# Patient Record
Sex: MALE | Race: WHITE | NOT HISPANIC OR LATINO | Employment: UNEMPLOYED | URBAN - METROPOLITAN AREA
[De-identification: names, ages, dates, MRNs, and addresses within clinical notes are randomized per-mention and may not be internally consistent; named-entity substitution may affect disease eponyms.]

---

## 2024-11-02 ENCOUNTER — HOSPITAL ENCOUNTER (EMERGENCY)
Facility: HOSPITAL | Age: 49
Discharge: HOME | End: 2024-11-02
Attending: INTERNAL MEDICINE
Payer: MEDICAID

## 2024-11-02 VITALS
WEIGHT: 189.63 LBS | TEMPERATURE: 98 F | DIASTOLIC BLOOD PRESSURE: 77 MMHG | HEART RATE: 82 BPM | SYSTOLIC BLOOD PRESSURE: 140 MMHG | BODY MASS INDEX: 27.15 KG/M2 | RESPIRATION RATE: 17 BRPM | HEIGHT: 70 IN | OXYGEN SATURATION: 98 %

## 2024-11-02 DIAGNOSIS — N43.3 BILATERAL HYDROCELE: ICD-10-CM

## 2024-11-02 DIAGNOSIS — N39.0 URINARY TRACT INFECTION WITHOUT HEMATURIA, SITE UNSPECIFIED: ICD-10-CM

## 2024-11-02 DIAGNOSIS — K40.90 REDUCIBLE RIGHT INGUINAL HERNIA: Primary | ICD-10-CM

## 2024-11-02 DIAGNOSIS — N50.811 TESTICULAR PAIN, RIGHT: ICD-10-CM

## 2024-11-02 LAB
BACTERIA #/AREA URNS AUTO: ABNORMAL /HPF
BILIRUB UR QL STRIP.AUTO: NEGATIVE
C TRACH DNA SPEC QL NAA+PROBE: NOT DETECTED
CLARITY UR: CLEAR
COLOR UR AUTO: YELLOW
GLUCOSE UR QL STRIP: NORMAL
HGB UR QL STRIP: NEGATIVE
HYALINE CASTS #/AREA URNS LPF: ABNORMAL /LPF
KETONES UR QL STRIP: NEGATIVE
LEUKOCYTE ESTERASE UR QL STRIP: NEGATIVE
MUCOUS THREADS URNS QL MICRO: ABNORMAL /LPF
N GONORRHOEA DNA SPEC QL NAA+PROBE: NOT DETECTED
NITRITE UR QL STRIP: NEGATIVE
PH UR STRIP: 5.5 [PH]
PROT UR QL STRIP: ABNORMAL
RBC #/AREA URNS AUTO: ABNORMAL /HPF
SOURCE (OHS): NORMAL
SP GR UR STRIP.AUTO: 1.02 (ref 1–1.03)
SPERM URNS QL MICRO: ABNORMAL /HPF
SQUAMOUS #/AREA URNS LPF: ABNORMAL /HPF
UROBILINOGEN UR STRIP-ACNC: NORMAL
WBC #/AREA URNS AUTO: ABNORMAL /HPF

## 2024-11-02 PROCEDURE — 99284 EMERGENCY DEPT VISIT MOD MDM: CPT | Mod: 25

## 2024-11-02 PROCEDURE — 87591 N.GONORRHOEAE DNA AMP PROB: CPT | Performed by: INTERNAL MEDICINE

## 2024-11-02 PROCEDURE — 81001 URINALYSIS AUTO W/SCOPE: CPT | Performed by: INTERNAL MEDICINE

## 2024-11-02 RX ORDER — ACETAMINOPHEN 500 MG
1000 TABLET ORAL
Status: DISCONTINUED | OUTPATIENT
Start: 2024-11-02 | End: 2024-11-02 | Stop reason: HOSPADM

## 2024-11-02 RX ORDER — BUPRENORPHINE HYDROCHLORIDE, NALOXONE HYDROCHLORIDE 8; 2 MG/1; MG/1
1 FILM, SOLUBLE BUCCAL; SUBLINGUAL 2 TIMES DAILY
COMMUNITY
Start: 2024-09-27

## 2024-11-02 RX ORDER — SULFAMETHOXAZOLE AND TRIMETHOPRIM 800; 160 MG/1; MG/1
1 TABLET ORAL 2 TIMES DAILY
Qty: 14 TABLET | Refills: 0 | Status: SHIPPED | OUTPATIENT
Start: 2024-11-02 | End: 2024-11-09

## 2024-11-02 RX ORDER — HYDROXYZINE PAMOATE 25 MG/1
25-50 CAPSULE ORAL NIGHTLY PRN
COMMUNITY
Start: 2024-08-06

## 2024-11-02 NOTE — ED PROVIDER NOTES
Encounter Date: 11/2/2024  History from patient     History     Chief Complaint   Patient presents with    Groin Swelling     Pt reports pulling on shingles x 3 days ago and felt a pain in the right groin that caused edema from the groin to the testicles.      HPI    Phill Mac is 49 y.o. male who  has no past medical history on file. arrives in ER with c/o Groin Swelling (Pt reports pulling on shingles x 3 days ago and felt a pain in the right groin that caused edema from the groin to the testicles. )    Review of patient's allergies indicates:   Allergen Reactions    Toradol [ketorolac]      History reviewed. No pertinent past medical history.  Past Surgical History:   Procedure Laterality Date    APPENDECTOMY       Family History   Problem Relation Name Age of Onset    Heart disease Maternal Grandmother       Social History     Tobacco Use    Smoking status: Never   Substance Use Topics    Alcohol use: No    Drug use: Not Currently     Types: Heroin     Review of Systems   Constitutional:  Negative for fever.   HENT:  Negative for trouble swallowing and voice change.    Eyes:  Negative for visual disturbance.   Respiratory:  Negative for cough and shortness of breath.    Cardiovascular:  Negative for chest pain.   Gastrointestinal:  Negative for abdominal pain, diarrhea and vomiting.   Genitourinary:  Positive for testicular pain. Negative for dysuria, hematuria, penile swelling and scrotal swelling.   Musculoskeletal:  Negative for back pain and gait problem.   Skin:  Negative for color change and rash.   Neurological:  Negative for headaches.   Psychiatric/Behavioral:  Negative for behavioral problems and sleep disturbance.    All other systems reviewed and are negative.    Physical Exam     Initial Vitals [11/02/24 1710]   BP Pulse Resp Temp SpO2   (!) 148/88 98 16 98.4 °F (36.9 °C) 99 %      MAP       --         Physical Exam    Nursing note and vitals reviewed.  Constitutional: He appears well-developed.  No distress.   HENT:   Head: Atraumatic.   Eyes: EOM are normal.   Cardiovascular:  Normal rate, regular rhythm and normal heart sounds.           Pulmonary/Chest: Breath sounds normal.   Abdominal: Abdomen is soft. Bowel sounds are normal. He exhibits no distension. There is no abdominal tenderness.   Small reducible right inguinal hernia There is no rebound.   Genitourinary:    Penis normal.   No discharge found.    Genitourinary Comments: Patient does not have any swelling of the testicles, he is uncircumcised, he does report tenderness in testicles, more on the right than the left, but the testicles are soft with no particular swelling in them.  He is very apprehensive.     Musculoskeletal:         General: Normal range of motion.      Cervical back: No bony tenderness.     Neurological: He is alert.   Speech Normal   Skin: Skin is dry.   Psychiatric: He has a normal mood and affect.   Pleasant       ED Course   Procedures  Orders Placed This Encounter    Chlamydia/GC, PCR    US Scrotum And Testicles    Urinalysis, Reflex to Urine Culture    Ambulatory referral/consult to General Surgery    sulfamethoxazole-trimethoprim 800-160mg (BACTRIM DS) 800-160 mg Tab    acetaminophen tablet 1,000 mg       Labs Reviewed   URINALYSIS, REFLEX TO URINE CULTURE - Abnormal       Result Value    Color, UA Yellow      Appearance, UA Clear      Specific Gravity, UA 1.025      pH, UA 5.5      Protein, UA 1+ (*)     Glucose, UA Normal      Ketones, UA Negative      Blood, UA Negative      Bilirubin, UA Negative      Urobilinogen, UA Normal      Nitrites, UA Negative      Leukocyte Esterase, UA Negative      RBC, UA 0-5      WBC, UA 6-10 (*)     Bacteria, UA Many (*)     Squamous Epithelial Cells, UA None Seen      Mucous, UA Few (*)     Sperm, UA Moderate (*)     Hyaline Casts, UA 3-5 (*)    CHLAMYDIA/GONORRHOEAE(GC), PCR    Chlamydia trachomatis PCR Not Detected      N. gonorrhea PCR Not Detected      Source Urine      Narrative:      The Xpert CT/NG test, performed on the GeneXpert system is a qualitative in vitro real-time polymerase chain reaction (PCR) test for the automated detected and differentiation for genomic DNA from Chlamydia trachomatis (CT) and/or Neisseria gonorrhoeae (NG).          Imaging Results              US Scrotum And Testicles (Final result)  Result time 11/02/24 21:16:56      Final result by Jeff Thakkar MD (11/02/24 21:16:56)                   Impression:      1. Bilateral unremarkable testicles    2. Bilateral hydrocele containing echogenic debris.      Electronically signed by: Jeff Thakkar  Date:    11/02/2024  Time:    21:16               Narrative:    EXAMINATION:  US SCROTUM AND TESTICLES    CLINICAL HISTORY:  Right testicular pain    TECHNIQUE:  Multiple real-time images were performed of the scrotum in various planes by snow    COMPARISON:  None available    FINDINGS:  Right testicle measures 3.9 x 2.6 x 3.3 cm and shows unremarkable echotexture.  There is unremarkable arteriovenous flow to the right testicle.  Right hydrocele is noted which measures 2 3.7 x 1.5 cm and appears to contain some internal echogenic debris.    Left testicle measures 4.2 x 2.5 x 3.6 cm.  There is unremarkable echotexture of the left testicle and unremarkable arteriovenous flow.  There is also left hydrocele measuring 2.9 x 1.4 cm with internal echogenic debris.                                       Medications   acetaminophen tablet 1,000 mg (has no administration in time range)     Medical Decision Making    Phill Mac is 49 y.o. male who  has no past medical history on file. arrives in ER with c/o Groin Swelling (Pt reports pulling on shingles x 3 days ago and felt a pain in the right groin that caused edema from the groin to the testicles. )    Patient essentially says that he was picking up some shingles and developed right inguinal pain, and swelling, he has been going on for 3 days and today decided to come to the  emergency room.  He says that he has swelling of the testicles.  On examination he has a reducible hernia, he is uncircumcised, does not have any particular testicular swelling or scrotal swelling, he does report a lot of pain when I am palpating the testicles, I will do the ultrasound on his testicle.  But it appears like reducible inguinal hernia which he is seeing.    When I asked him to give me a urine sample he says that he has will not be able to urinate, and I advised him that I will have to check the urine for gonorrhea and chlamydia and he reassures me that he does not have gonorrhea and chlamydia, because he uses a condom every single time even with his wife, and I advised him that I will still have to check the urine to make sure there is everything okay.  He will try to give me a urine sample.    Amount and/or Complexity of Data Reviewed  Radiology: ordered.               ED Course as of 11/02/24 2137   Sat Nov 02, 2024 2129 Patient does not have any torsion, as on examination he did not have that either, he has that reducible inguinal hernia and essentially normal testicles on ultrasound except for small hydrocele which does not appear to be an acute problem at this time.  I am going to advise him to follow up with surgery for evaluation of his reducible inguinal hernia and continue taking his Suboxone and see family doctor for follow up. [GQ]   2130 He does have a mild urinary tract infection, gonorrhea and chlamydia is negative, I will put him on antibiotic for that. [GQ]   2131 I have sent a consult for General surgery [GQ]      ED Course User Index  [GQ] Red Hicks MD                     Clinical Impression:  Final diagnoses:  [N50.811] Testicular pain, right  [K40.90] Reducible right inguinal hernia (Primary)  [N43.3] Bilateral hydrocele  [N39.0] Urinary tract infection without hematuria, site unspecified          ED Disposition Condition    Discharge Stable          ED Prescriptions        Medication Sig Dispense Start Date End Date Auth. Provider    sulfamethoxazole-trimethoprim 800-160mg (BACTRIM DS) 800-160 mg Tab Take 1 tablet by mouth 2 (two) times daily. for 7 days 14 tablet 11/2/2024 11/9/2024 Red Hicks MD          Follow-up Information       Follow up With Specialties Details Why Contact Info    PMD  In 2 days      General surgery                 Red Hicks MD  11/02/24 2136       Red Hicks MD  11/02/24 6552

## 2024-11-02 NOTE — DISCHARGE INSTRUCTIONS
Take Tylenol 2 tablets 3 times a day as needed for pain    Take medicines as prescribed    See your family doctor in one to 2 days for further evaluation, workup, and treatment as necessary    Avoid driving or operating machinery while taking medicines as some medicines might cause drowsiness and may cause problems. Also pain medicines have potential of being addictive  so use Pain meds specially Narcotics Sparingly.    The exam and treatment you received in Emergency Room was for an urgent problem and NOT INTENDED AS COMPLETE CARE. It is important that you FOLLOW UP with a doctor for ongoing care. If your symptoms become WORSE or you DO NOT IMPROVE and you are unable to reach your health care provider, you should RETURN to the emergency department. The Emergency Room doctor has provided a PRELIMINARY INTERPRETATION of all your STUDIES. A final interpretation may be done after you are discharged. IF A CHANGE in your diagnosis or treatment is needed WE WILL CONTACT YOU. It is critical that we have a CURRENT PHONE NUMBER FOR YOU.

## 2024-11-19 ENCOUNTER — OFFICE VISIT (OUTPATIENT)
Dept: SURGERY | Facility: CLINIC | Age: 49
End: 2024-11-19
Payer: MEDICAID

## 2024-11-19 VITALS
OXYGEN SATURATION: 97 % | SYSTOLIC BLOOD PRESSURE: 128 MMHG | HEIGHT: 66 IN | TEMPERATURE: 98 F | WEIGHT: 176 LBS | BODY MASS INDEX: 28.28 KG/M2 | HEART RATE: 102 BPM | DIASTOLIC BLOOD PRESSURE: 79 MMHG | RESPIRATION RATE: 18 BRPM

## 2024-11-19 DIAGNOSIS — K40.90 REDUCIBLE RIGHT INGUINAL HERNIA: ICD-10-CM

## 2024-11-19 PROCEDURE — 99215 OFFICE O/P EST HI 40 MIN: CPT | Mod: PBBFAC

## 2024-11-19 RX ORDER — GABAPENTIN 300 MG/1
300 CAPSULE ORAL 3 TIMES DAILY
Qty: 90 CAPSULE | Refills: 11 | Status: SHIPPED | OUTPATIENT
Start: 2024-11-19 | End: 2025-11-19

## 2024-11-19 NOTE — PROGRESS NOTES
[facility] General Surgery Clinic Note    HPI: Patient is a 48 yo man with no significant past medical history who presents to the surgery clinic for evaluation of right inguinal hernia. He first noticed this hernia 2 years ago which was initially exacerbated by recurrent coughing from allergies. Within the past 3 weeks, he reports recurrent burning 10/10 pain which waxes and wanes with movement or exertion which starts in his right lower abdomen and radiates to the scrotum. He does also notice a slight bulge in this area, but he has not tried reducing it manually. He did go the the ED on 11/2 for this hernia, where he was discharged and told to follow up in clinic for his hernia.    He denies bloody stool, constipation, diarrhea, redness to the affected area, dysuria, fevers, chills, chest pain, and shortness of breath. He does endorse difficulty maintaining an erection, as well as occasional painful ejaculations. He denies cigarette use, alcohol use, and current use of any other substances, though he does have a history of heroin use. His only surgical history was an appendectomy around 2010, which had no complications. He is interested in surgery to fix this hernia.    PMH: No past medical history on file.   Meds:   Current Outpatient Medications:     hydrOXYzine pamoate (VISTARIL) 25 MG Cap, Take 25-50 mg by mouth nightly as needed. (Patient not taking: Reported on 11/19/2024), Disp: , Rfl:     SUBOXONE 8-2 mg, Place 1 Film under the tongue 2 (two) times daily. (Patient not taking: Reported on 11/19/2024), Disp: , Rfl:   Allergies:   Review of patient's allergies indicates:   Allergen Reactions    Toradol [ketorolac]      Social History:   Social History     Tobacco Use    Smoking status: Never   Substance Use Topics    Alcohol use: No    Drug use: Not Currently     Types: Heroin     Family History:   Family History   Problem Relation Name Age of Onset    Heart disease Maternal Grandmother       Surgical History:    Past Surgical History:   Procedure Laterality Date    APPENDECTOMY       Review of Systems:  Otherwise negative unless noted above    Objective:    Vitals:  Vitals:    11/19/24 1253   BP: 128/79   Pulse:    Resp:    Temp:         Physical Exam:  Gen: NAD  Neuro: awake, alert, answering questions appropriately  CV: RRR  Resp: non-labored breathing, SAE  Abd: soft, ND, NT  : tenderness to right inguinal area; palpable mass extends to scrotum  Ext: moves all 4 spontaneously and purposefully  Skin: warm, well perfused      Imaging:  US Scrotum And Testicles    Result Date: 11/2/2024  EXAMINATION:  US SCROTUM AND TESTICLES    CLINICAL HISTORY:  Right testicular pain    TECHNIQUE:  Multiple real-time images were performed of the scrotum in various planes by snow    COMPARISON:  None available    FINDINGS:  Right testicle measures 3.9 x 2.6 x 3.3 cm and shows unremarkable echotexture.  There is unremarkable arteriovenous flow to the right testicle.  Right hydrocele is noted which measures 2 3.7 x 1.5 cm and appears to contain some internal echogenic debris.    Left testicle measures 4.2 x 2.5 x 3.6 cm.  There is unremarkable echotexture of the left testicle and unremarkable arteriovenous flow.  There is also left hydrocele measuring 2.9 x 1.4 cm with internal echogenic debris.           Assessment/Plan:  Patient is a 50 yo man with no significant past medical history who presents for evaluation of right inguinal hernia which causes intermittent burning pain which radiates to the scrotum. The pain is worsened by coughing and movement, and partially alleviated by motrin. On exam, there was tenderness to the right inguinal area. Ultrasound of the scrotum did not show any evidence of a hernia, but the imaging did not include the inguinal area.    - obtain inguinal ultrasound  - refer to urology for ED and painful ejaculation  - gabapentin for burning pain  - follow up in one month    Mohamud Rios, MS3  [facility]  [facility]

## 2024-11-20 NOTE — PROGRESS NOTES
[facility] General Surgery Clinic Note    HPI: Patient is a 50 yo man with no significant past medical history who presents to the surgery clinic for evaluation of right inguinal hernia. He first noticed this hernia 2 years ago which was initially exacerbated by recurrent coughing from allergies. Within the past 3 weeks, he reports recurrent burning 10/10 pain which waxes and wanes with movement or exertion which starts in his right lower abdomen and radiates to the scrotum. He does also notice a slight bulge in this area, but he has not tried reducing it manually. He did go the the ED on 11/2 for this hernia, where he was discharged and told to follow up in clinic for his hernia.    He denies bloody stool, constipation, diarrhea, redness to the affected area, dysuria, fevers, chills, chest pain, and shortness of breath. He does endorse difficulty maintaining an erection, as well as occasional painful ejaculations. He denies cigarette use, alcohol use, and current use of any other substances, though he does have a history of heroin use. His only surgical history was an appendectomy around 2010, which had no complications. He is interested in surgery to fix this hernia.    PMH: No past medical history on file.   Meds:   Current Outpatient Medications:     gabapentin (NEURONTIN) 300 MG capsule, Take 1 capsule (300 mg total) by mouth 3 (three) times daily., Disp: 90 capsule, Rfl: 11    hydrOXYzine pamoate (VISTARIL) 25 MG Cap, Take 25-50 mg by mouth nightly as needed. (Patient not taking: Reported on 11/19/2024), Disp: , Rfl:     SUBOXONE 8-2 mg, Place 1 Film under the tongue 2 (two) times daily. (Patient not taking: Reported on 11/19/2024), Disp: , Rfl:   Allergies:   Review of patient's allergies indicates:   Allergen Reactions    Toradol [ketorolac]      Social History:   Social History     Tobacco Use    Smoking status: Never   Substance Use Topics    Alcohol use: No    Drug use: Not Currently     Types: Heroin     Family  History:   Family History   Problem Relation Name Age of Onset    Heart disease Maternal Grandmother       Surgical History:   Past Surgical History:   Procedure Laterality Date    APPENDECTOMY       Review of Systems:  Otherwise negative unless noted above    Objective:    Vitals:  Vitals:    11/19/24 1253   BP: 128/79   Pulse:    Resp:    Temp:         Physical Exam:  Gen: NAD  Neuro: awake, alert, answering questions appropriately  CV: RRR  Resp: non-labored breathing, SAE  Abd: soft, ND, NT  : tenderness to right inguinal area; palpable mass extends to scrotum  Ext: moves all 4 spontaneously and purposefully  Skin: warm, well perfused      Imaging:  US Scrotum And Testicles    Result Date: 11/2/2024  EXAMINATION:  US SCROTUM AND TESTICLES    CLINICAL HISTORY:  Right testicular pain    TECHNIQUE:  Multiple real-time images were performed of the scrotum in various planes by snow    COMPARISON:  None available    FINDINGS:  Right testicle measures 3.9 x 2.6 x 3.3 cm and shows unremarkable echotexture.  There is unremarkable arteriovenous flow to the right testicle.  Right hydrocele is noted which measures 2 3.7 x 1.5 cm and appears to contain some internal echogenic debris.    Left testicle measures 4.2 x 2.5 x 3.6 cm.  There is unremarkable echotexture of the left testicle and unremarkable arteriovenous flow.  There is also left hydrocele measuring 2.9 x 1.4 cm with internal echogenic debris.           Assessment/Plan:  Patient is a 48 yo man with no significant past medical history who presents for evaluation of right inguinal hernia which causes intermittent burning pain which radiates to the scrotum. The pain is worsened by coughing and movement, and partially alleviated by motrin. On exam, there was tenderness to the right inguinal area. Ultrasound of the scrotum did not show any evidence of a hernia, but the imaging did not include the inguinal area.    - obtain inguinal ultrasound  - refer to urology for  ED and painful ejaculation  - gabapentin for neuropathic pain  - follow up in one month    Mohamud Rios, MS3  [facility]    Reddy Bee MD  [facility] General Surgery

## 2024-11-21 NOTE — PROGRESS NOTES
I have reviewed the notes, assessments, and/or procedures performed by Dr Bee, I concur with her/his documentation of Phill Mac.  Date of Service: 11/19/2024    [facility]